# Patient Record
Sex: FEMALE | Race: BLACK OR AFRICAN AMERICAN | NOT HISPANIC OR LATINO | Employment: STUDENT | URBAN - METROPOLITAN AREA
[De-identification: names, ages, dates, MRNs, and addresses within clinical notes are randomized per-mention and may not be internally consistent; named-entity substitution may affect disease eponyms.]

---

## 2018-09-24 PROBLEM — F11.10 NARCOTIC ABUSE: Status: ACTIVE | Noted: 2018-09-24

## 2018-09-26 PROBLEM — F90.2 ADHD (ATTENTION DEFICIT HYPERACTIVITY DISORDER), COMBINED TYPE: Status: ACTIVE | Noted: 2018-09-26

## 2018-09-26 PROBLEM — Z87.39 HISTORY OF RIGHT TENNIS ELBOW: Status: ACTIVE | Noted: 2018-09-26

## 2018-09-26 PROBLEM — M62.838 MUSCLE SPASMS OF NECK: Status: ACTIVE | Noted: 2018-09-26

## 2019-04-17 PROBLEM — E55.9 VITAMIN D DEFICIENCY: Status: ACTIVE | Noted: 2019-04-17

## 2019-04-17 PROBLEM — F17.200 CURRENT SMOKER: Status: ACTIVE | Noted: 2019-04-17

## 2019-04-17 PROBLEM — Z00.00 PHYSICAL EXAM, ANNUAL: Status: ACTIVE | Noted: 2019-04-17

## 2019-05-12 PROBLEM — R31.21 ASYMPTOMATIC MICROSCOPIC HEMATURIA: Status: ACTIVE | Noted: 2019-05-12

## 2019-07-22 PROBLEM — Z00.00 PHYSICAL EXAM, ANNUAL: Status: RESOLVED | Noted: 2019-04-17 | Resolved: 2019-07-22

## 2019-09-10 PROBLEM — J01.00 ACUTE NON-RECURRENT MAXILLARY SINUSITIS: Status: ACTIVE | Noted: 2019-09-10

## 2019-12-16 PROBLEM — J01.00 ACUTE NON-RECURRENT MAXILLARY SINUSITIS: Status: RESOLVED | Noted: 2019-09-10 | Resolved: 2019-12-16

## 2021-11-13 ENCOUNTER — IMMUNIZATION (OUTPATIENT)
Dept: PRIMARY CARE CLINIC | Facility: CLINIC | Age: 41
End: 2021-11-13

## 2021-11-13 DIAGNOSIS — Z23 NEED FOR VACCINATION: Primary | ICD-10-CM

## 2021-11-13 PROCEDURE — 0004A COVID-19, MRNA, LNP-S, PF, 30 MCG/0.3 ML DOSE VACCINE: CPT | Mod: CV19,PBBFAC | Performed by: FAMILY MEDICINE

## 2022-02-08 PROBLEM — B02.29 POST HERPETIC NEURALGIA: Status: ACTIVE | Noted: 2022-02-08

## 2022-02-08 PROBLEM — F17.200 TOBACCO DEPENDENCE: Status: ACTIVE | Noted: 2022-02-08

## 2022-02-09 ENCOUNTER — TELEPHONE (OUTPATIENT)
Dept: PAIN MEDICINE | Facility: CLINIC | Age: 42
End: 2022-02-09

## 2022-02-09 NOTE — TELEPHONE ENCOUNTER
Contacted patient regarding referral with [facility]. Scheduled appointment.  Patient states her insurance lapses tomorrow.  She will be getting coverage through [employer].  If not, she hopes to be employed by time of appt with new insurance in place.    Jenn Yun RN

## 2022-03-08 ENCOUNTER — TELEPHONE (OUTPATIENT)
Dept: PSYCHIATRY | Facility: CLINIC | Age: 42
End: 2022-03-08

## 2022-03-08 NOTE — TELEPHONE ENCOUNTER
I spoke with Sylvia Thakur about her upcoming appt. With Thelma Dooleyice,that I received a message that she didn't have insurance coverage at this time. Pt. Said she needs to cancel appt. Because she doesn't have insurance coverage at this time.

## 2022-03-08 NOTE — TELEPHONE ENCOUNTER
I receive a message that pt. Insurance wasn't current and needs to be update before the upcoming appt. Or it will be self pay.

## 2022-08-11 ENCOUNTER — TELEPHONE (OUTPATIENT)
Dept: PAIN MEDICINE | Facility: CLINIC | Age: 42
End: 2022-08-11
Payer: COMMERCIAL

## 2022-08-12 ENCOUNTER — TELEPHONE (OUTPATIENT)
Dept: PAIN MEDICINE | Facility: CLINIC | Age: 42
End: 2022-08-12
Payer: COMMERCIAL

## 2022-08-15 ENCOUNTER — TELEPHONE (OUTPATIENT)
Dept: PAIN MEDICINE | Facility: CLINIC | Age: 42
End: 2022-08-15
Payer: COMMERCIAL

## 2022-08-24 ENCOUNTER — TELEPHONE (OUTPATIENT)
Dept: ORTHOPEDICS | Facility: CLINIC | Age: 42
End: 2022-08-24
Payer: COMMERCIAL

## 2022-08-24 NOTE — TELEPHONE ENCOUNTER
Contacted patient and confirmed appointment for 8/29/2022 at 4 pm at the [place] location.  Asked patient to arrive 30 minutes prior to appt time for xray.  Patient verbalized understanding of appt date, time and location.

## 2022-08-26 DIAGNOSIS — M25.552 LEFT HIP PAIN: Primary | ICD-10-CM

## 2022-08-29 ENCOUNTER — HOSPITAL ENCOUNTER (OUTPATIENT)
Dept: RADIOLOGY | Facility: HOSPITAL | Age: 42
Discharge: HOME | End: 2022-08-29
Attending: STUDENT IN AN ORGANIZED HEALTH CARE EDUCATION/TRAINING PROGRAM
Payer: COMMERCIAL

## 2022-08-29 ENCOUNTER — OFFICE VISIT (OUTPATIENT)
Dept: ORTHOPEDICS | Facility: CLINIC | Age: 42
End: 2022-08-29
Payer: COMMERCIAL

## 2022-08-29 VITALS — HEIGHT: 64 IN | BODY MASS INDEX: 33.29 KG/M2 | WEIGHT: 195 LBS

## 2022-08-29 DIAGNOSIS — M53.3 SACROILIAC JOINT PAIN: ICD-10-CM

## 2022-08-29 DIAGNOSIS — M25.552 LEFT HIP PAIN: ICD-10-CM

## 2022-08-29 DIAGNOSIS — M25.552 HIP PAIN, ACUTE, LEFT: Primary | ICD-10-CM

## 2022-08-29 PROCEDURE — 99203 PR OFFICE/OUTPT VISIT, NEW, LEVL III, 30-44 MIN: ICD-10-PCS | Mod: S$GLB,,, | Performed by: STUDENT IN AN ORGANIZED HEALTH CARE EDUCATION/TRAINING PROGRAM

## 2022-08-29 PROCEDURE — 1159F PR MEDICATION LIST DOCUMENTED IN MEDICAL RECORD: ICD-10-PCS | Mod: CPTII,S$GLB,, | Performed by: STUDENT IN AN ORGANIZED HEALTH CARE EDUCATION/TRAINING PROGRAM

## 2022-08-29 PROCEDURE — 73503 X-RAY EXAM HIP UNI 4/> VIEWS: CPT | Mod: 26,LT,, | Performed by: STUDENT IN AN ORGANIZED HEALTH CARE EDUCATION/TRAINING PROGRAM

## 2022-08-29 PROCEDURE — 73503 X-RAY EXAM HIP UNI 4/> VIEWS: CPT | Mod: TC,LT

## 2022-08-29 PROCEDURE — 1160F PR REVIEW ALL MEDS BY PRESCRIBER/CLIN PHARMACIST DOCUMENTED: ICD-10-PCS | Mod: CPTII,S$GLB,, | Performed by: STUDENT IN AN ORGANIZED HEALTH CARE EDUCATION/TRAINING PROGRAM

## 2022-08-29 PROCEDURE — 99203 OFFICE O/P NEW LOW 30 MIN: CPT | Mod: S$GLB,,, | Performed by: STUDENT IN AN ORGANIZED HEALTH CARE EDUCATION/TRAINING PROGRAM

## 2022-08-29 PROCEDURE — 1160F RVW MEDS BY RX/DR IN RCRD: CPT | Mod: CPTII,S$GLB,, | Performed by: STUDENT IN AN ORGANIZED HEALTH CARE EDUCATION/TRAINING PROGRAM

## 2022-08-29 PROCEDURE — 3008F BODY MASS INDEX DOCD: CPT | Mod: CPTII,S$GLB,, | Performed by: STUDENT IN AN ORGANIZED HEALTH CARE EDUCATION/TRAINING PROGRAM

## 2022-08-29 PROCEDURE — 99999 PR PBB SHADOW E&M-EST. PATIENT-LVL IV: CPT | Mod: PBBFAC,,, | Performed by: STUDENT IN AN ORGANIZED HEALTH CARE EDUCATION/TRAINING PROGRAM

## 2022-08-29 PROCEDURE — 3008F PR BODY MASS INDEX (BMI) DOCUMENTED: ICD-10-PCS | Mod: CPTII,S$GLB,, | Performed by: STUDENT IN AN ORGANIZED HEALTH CARE EDUCATION/TRAINING PROGRAM

## 2022-08-29 PROCEDURE — 99999 PR PBB SHADOW E&M-EST. PATIENT-LVL IV: ICD-10-PCS | Mod: PBBFAC,,, | Performed by: STUDENT IN AN ORGANIZED HEALTH CARE EDUCATION/TRAINING PROGRAM

## 2022-08-29 PROCEDURE — 1159F MED LIST DOCD IN RCRD: CPT | Mod: CPTII,S$GLB,, | Performed by: STUDENT IN AN ORGANIZED HEALTH CARE EDUCATION/TRAINING PROGRAM

## 2022-08-29 PROCEDURE — 73503 XR HIP WITH PELVIS WHEN PERFORMED, 4 OR MORE VIEWS LEFT: ICD-10-PCS | Mod: 26,LT,, | Performed by: STUDENT IN AN ORGANIZED HEALTH CARE EDUCATION/TRAINING PROGRAM

## 2022-08-29 RX ORDER — METHYLPREDNISOLONE 4 MG/1
TABLET ORAL
Qty: 21 EACH | Refills: 0 | Status: SHIPPED | OUTPATIENT
Start: 2022-08-29 | End: 2023-10-30

## 2022-08-29 NOTE — PATIENT INSTRUCTIONS
Assessment:  Sylvia Thakur is a 42 y.o. female   Chief Complaint   Patient presents with    Left Hip - Pain, Injury       Encounter Diagnosis   Name Primary?    Hip pain, acute, left Yes        Plan:  Prescribed medrol dose pack  Referred to [facility]   Follow up in 4-6 weeks after physical therapy.    Follow-up: 4-6 weeks  or sooner if there are any problems between now and then.    Thank you for choosing [facility] and Dr. Varun Mar for your orthopedic & sports medicine care. It is our goal to provide you with exceptional care that will help keep you healthy, active, and get you back in the game.    Please do not hesitate to reach out to us via email, phone, or MyChart with any questions, concerns, or feedback.    If you felt that you received exemplary care today, please consider leaving us feedback on [employer] at:  [email or web address]    If you are experiencing pain/discomfort ,or have questions after 5pm and would like to be connected to the [facility] on-call team, please call this number and specify which Sports Medicine provider is treating you: [phone]

## 2022-08-29 NOTE — PROGRESS NOTES
Patient ID: Sylvia Thakur  YOB: 1980  MRN: [id number]    Chief Complaint: Pain and Injury of the Left Hip      Referred By: Yamilet Swift MD for Left Hip Pain    History of Present Illness: Sylvia Thakur is a right-hand dominant 42 y.o. female who presents today with left hip pain.  Patient states that she has had left hip pain for the last 3 years but recently suffered a fall 3 weeks ago where she landed directly on her left hip and directly on her left elbow in a flexed position.  Patient is also concerned about her left elbow hurting today.  Patient states that her left hip is a constant, aching and throbbing sensation that she rates at a 6/10.  She states that she uses Biofreeze, ice packs, heating pads and ibuprofen to give her temporary relief from the pain.  She states that the weather changes increase her pain.  She states that the hip doesn't hurt to palpate but with movement it hurts in the gluteal region.     The patient is active in none.  Occupation: Teacher       Past Medical History:   Past Medical History:   Diagnosis Date    Acquired hypothyroidism     Anxiety     Attention deficit hyperactivity disorder (ADHD), predominantly inattentive type     Generalized anxiety disorder     Herpes zoster     Hyperprolactinemia     Menopause     Migraine     Migraine     Narcotic addiction     Postherpetic neuralgia     Primary fibromyalgia     Vitamin D deficiency      History reviewed. No pertinent surgical history.  Family History   Problem Relation Age of Onset    Hypertension Mother     Heart disease Father      Social History     Socioeconomic History    Marital status:    Tobacco Use    Smoking status: Former    Smokeless tobacco: Never   Substance and Sexual Activity    Alcohol use: Yes     Comment: social    Drug use: No    Sexual activity: Yes     Medication List with Changes/Refills   New Medications    METHYLPREDNISOLONE (MEDROL DOSEPACK) 4 MG TABLET    use as  directed   Current Medications    ALPRAZOLAM (XANAX) 0.5 MG TABLET    Take 0.5 mg by mouth 2 (two) times daily as needed.    AMITRIPTYLINE (ELAVIL) 75 MG TABLET    TAKE 1 TABLET(75 MG) BY MOUTH EVERY EVENING    HYDROXYZINE HCL (ATARAX) 25 MG TABLET    Take 1 tablet (25 mg total) by mouth 3 (three) times daily as needed for Itching or Anxiety.    SERTRALINE (ZOLOFT) 100 MG TABLET    Take 1 tablet (100 mg total) by mouth once daily.    VALACYCLOVIR (VALTREX) 500 MG TABLET    Take 1 tablet (500 mg total) by mouth 2 (two) times daily.     Review of patient's allergies indicates:   Allergen Reactions    Iodine and iodide containing products Anaphylaxis       Physical Exam:   Body mass index is 33.47 kg/m².    GENERAL: Well appearing, in no acute distress.  HEAD: Normocephalic and atraumatic.  ENT: External ears and nose grossly normal.  EYES: EOMI bilaterally  PULMONARY: Respirations are grossly even and non-labored.  NEURO: Awake, alert, and oriented x 3.  SKIN: No obvious rashes appreciated.  PSYCH: Mood & affect are appropriate.    Detailed MSK exam:   Left hip exam: TTP sacroiliac joint. Muscle strength 4+/5. Negative straight leg raise. Sensation intact bilaterally. Negative JANIYA and FADIR.   Left elbow exam: full active and passive ROM. TTP olecranon bursa. Muscle strength 5/5 with elbow flexion, extension, supination and pronation. Sensation intact.  strength 5/5.     Imaging:  X-Ray Hip 4 or more views Left (with Pelvis when performed)  Narrative: EXAMINATION:  4 radiographic views of the LEFT HIP.    CLINICAL HISTORY:  Pain in left hip    TECHNIQUE:  4 Radiographic views of the LEFT HIP.    COMPARISON:  None.    FINDINGS:  4  Views of the left hip demonstrate normal alignment.  There is no fracture.  There is no bony mass lesion.  There is no soft tissue swelling.  Impression: No acute abnormality of the left hip.    Electronically signed by: Wang Piedra  MD  Date:    08/29/2022  Time:    16:05        Relevant imaging results were reviewed and interpreted by me and per my read shows no acute findings.  This was discussed with the patient and / or family today.     Assessment:  Sylvia Thakur is a 42 y.o. female presenting with acute on chronic left hip pain aggravated by a recent fall. History, physical and radiographs are consistent with a likely diagnosis of SI joint pain, mild muscle spasm, left elbow contusion with inflamed olecranon bursa. Unlikely true sciatica but could have some mild nerve irritation. Trial medrol dose pack and formal PT. Continue conservative management for pain. Advised discontinuing ibuprofen while taking medrol dose pack. Follow up in 6 weeks for recheck. Consider advanced imaging if not improving. All questions answered.     Hip pain, acute, left  -     Ambulatory referral/consult to Orthopedics  -     methylPREDNISolone (MEDROL DOSEPACK) 4 mg tablet; use as directed  Dispense: 21 each; Refill: 0  -     Ambulatory referral/consult to Physical/Occupational Therapy; Future; Expected date: 09/05/2022       A copy of today's visit note has been sent to the referring provider.     Electronically signed:  Varun Mar MD, MPH  08/29/2022  4:15 PM

## 2022-08-30 ENCOUNTER — TELEPHONE (OUTPATIENT)
Dept: ORTHOPEDICS | Facility: CLINIC | Age: 42
End: 2022-08-30
Payer: COMMERCIAL

## 2022-10-04 ENCOUNTER — TELEPHONE (OUTPATIENT)
Dept: ORTHOPEDICS | Facility: CLINIC | Age: 42
End: 2022-10-04
Payer: COMMERCIAL

## 2022-10-04 DIAGNOSIS — M25.522 LEFT ELBOW PAIN: Primary | ICD-10-CM

## 2022-10-04 NOTE — TELEPHONE ENCOUNTER
Patient was unable to get off work in time for appointment.  Patient has not been scheduled for physical therapy for her hip as of this time and is still having elbow pain.  Rescheduled patient for appointment on Friday, 10/7 at 4 pm for elbow appt with 3:30 xray and will follow up with physical therapy company that referral was placed with for hip to find out why patient hasn't been contacted for scheduling.   ----- Message from Josh Pope sent at 10/4/2022  4:02 PM CDT -----  Contact: Sylvia  Patient is calling to speak with the nurse regarding appt. Reports having appt scheduled for today at 4pm but will be running late because her job is short staffed and wants to know if she can still come to appt. Please give pt a call at .[phone]

## 2022-10-10 ENCOUNTER — OFFICE VISIT (OUTPATIENT)
Dept: ORTHOPEDICS | Facility: CLINIC | Age: 42
End: 2022-10-10
Payer: COMMERCIAL

## 2022-10-10 ENCOUNTER — HOSPITAL ENCOUNTER (OUTPATIENT)
Dept: RADIOLOGY | Facility: HOSPITAL | Age: 42
Discharge: HOME | End: 2022-10-10
Attending: STUDENT IN AN ORGANIZED HEALTH CARE EDUCATION/TRAINING PROGRAM
Payer: COMMERCIAL

## 2022-10-10 VITALS — WEIGHT: 195 LBS | BODY MASS INDEX: 33.29 KG/M2 | HEIGHT: 64 IN

## 2022-10-10 DIAGNOSIS — G56.22 ULNAR NEUROPATHY OF LEFT UPPER EXTREMITY: ICD-10-CM

## 2022-10-10 DIAGNOSIS — M25.522 LEFT ELBOW PAIN: ICD-10-CM

## 2022-10-10 DIAGNOSIS — M70.22 OLECRANON BURSITIS OF LEFT ELBOW: Primary | ICD-10-CM

## 2022-10-10 PROCEDURE — 73080 X-RAY EXAM OF ELBOW: CPT | Mod: TC,PO,LT

## 2022-10-10 PROCEDURE — 99999 PR PBB SHADOW E&M-EST. PATIENT-LVL IV: CPT | Mod: PBBFAC,,, | Performed by: STUDENT IN AN ORGANIZED HEALTH CARE EDUCATION/TRAINING PROGRAM

## 2022-10-10 PROCEDURE — 1160F RVW MEDS BY RX/DR IN RCRD: CPT | Mod: CPTII,S$GLB,, | Performed by: STUDENT IN AN ORGANIZED HEALTH CARE EDUCATION/TRAINING PROGRAM

## 2022-10-10 PROCEDURE — 99214 OFFICE O/P EST MOD 30 MIN: CPT | Mod: S$GLB,,, | Performed by: STUDENT IN AN ORGANIZED HEALTH CARE EDUCATION/TRAINING PROGRAM

## 2022-10-10 PROCEDURE — 1159F MED LIST DOCD IN RCRD: CPT | Mod: CPTII,S$GLB,, | Performed by: STUDENT IN AN ORGANIZED HEALTH CARE EDUCATION/TRAINING PROGRAM

## 2022-10-10 PROCEDURE — 3008F BODY MASS INDEX DOCD: CPT | Mod: CPTII,S$GLB,, | Performed by: STUDENT IN AN ORGANIZED HEALTH CARE EDUCATION/TRAINING PROGRAM

## 2022-10-10 PROCEDURE — 73080 XR ELBOW COMPLETE 3 VIEW LEFT: ICD-10-PCS | Mod: 26,LT,, | Performed by: RADIOLOGY

## 2022-10-10 PROCEDURE — 73080 X-RAY EXAM OF ELBOW: CPT | Mod: 26,LT,, | Performed by: RADIOLOGY

## 2022-10-10 PROCEDURE — 1159F PR MEDICATION LIST DOCUMENTED IN MEDICAL RECORD: ICD-10-PCS | Mod: CPTII,S$GLB,, | Performed by: STUDENT IN AN ORGANIZED HEALTH CARE EDUCATION/TRAINING PROGRAM

## 2022-10-10 PROCEDURE — 99214 PR OFFICE/OUTPT VISIT, EST, LEVL IV, 30-39 MIN: ICD-10-PCS | Mod: S$GLB,,, | Performed by: STUDENT IN AN ORGANIZED HEALTH CARE EDUCATION/TRAINING PROGRAM

## 2022-10-10 PROCEDURE — 1160F PR REVIEW ALL MEDS BY PRESCRIBER/CLIN PHARMACIST DOCUMENTED: ICD-10-PCS | Mod: CPTII,S$GLB,, | Performed by: STUDENT IN AN ORGANIZED HEALTH CARE EDUCATION/TRAINING PROGRAM

## 2022-10-10 PROCEDURE — 99999 PR PBB SHADOW E&M-EST. PATIENT-LVL IV: ICD-10-PCS | Mod: PBBFAC,,, | Performed by: STUDENT IN AN ORGANIZED HEALTH CARE EDUCATION/TRAINING PROGRAM

## 2022-10-10 PROCEDURE — 3008F PR BODY MASS INDEX (BMI) DOCUMENTED: ICD-10-PCS | Mod: CPTII,S$GLB,, | Performed by: STUDENT IN AN ORGANIZED HEALTH CARE EDUCATION/TRAINING PROGRAM

## 2022-10-10 RX ORDER — VALACYCLOVIR HYDROCHLORIDE 1 G/1
TABLET, FILM COATED ORAL
COMMUNITY
Start: 2022-09-27 | End: 2024-02-06

## 2022-10-10 NOTE — PROGRESS NOTES
Patient ID: Sylvia Thakur  YOB: 1980  MRN: [id number]    Chief Complaint: Pain of the Left Elbow      History of Present Illness: Sylvia Thakur is a right-hand dominant 42 y.o. female who presents today with left elbow pain.  Patient was last seen in clinic for left hip pain on 8/29/2022   She has had left elbow pain for several months after suffering a fall at the beginning of August where she landed on her left hip and elbow.  She states that the elbow became very swollen and inflamed several weeks ago but has since went down.  She describes her pain as an intermittent, throbbing pain that is relieved with heat.  She states that excessive movement increases her pain.      The patient is active in none.  Occupation: Teacher      Past Medical History:   Past Medical History:   Diagnosis Date    Acquired hypothyroidism     Anxiety     Attention deficit hyperactivity disorder (ADHD), predominantly inattentive type     Generalized anxiety disorder     Herpes zoster     Hyperprolactinemia     Menopause     Migraine     Migraine     Narcotic addiction     Postherpetic neuralgia     Primary fibromyalgia     Vitamin D deficiency      History reviewed. No pertinent surgical history.  Family History   Problem Relation Age of Onset    Hypertension Mother     Heart disease Father      Social History     Socioeconomic History    Marital status:    Tobacco Use    Smoking status: Former    Smokeless tobacco: Never   Substance and Sexual Activity    Alcohol use: Yes     Comment: social    Drug use: No    Sexual activity: Yes     Medication List with Changes/Refills   Current Medications    ALPRAZOLAM (XANAX) 0.5 MG TABLET    Take 0.5 mg by mouth 2 (two) times daily as needed.    AMITRIPTYLINE (ELAVIL) 75 MG TABLET    TAKE 1 TABLET(75 MG) BY MOUTH EVERY EVENING    GABAPENTIN (NEURONTIN) 300 MG CAPSULE    Take 1 capsule (300 mg total) by mouth 2 (two) times daily.    HYDROXYZINE HCL (ATARAX) 25 MG TABLET     Take 1 tablet (25 mg total) by mouth 3 (three) times daily as needed for Itching or Anxiety.    METHYLPREDNISOLONE (MEDROL DOSEPACK) 4 MG TABLET    use as directed    SERTRALINE (ZOLOFT) 100 MG TABLET    Take 1 tablet (100 mg total) by mouth once daily.    VALACYCLOVIR (VALTREX) 1000 MG TABLET        VALACYCLOVIR (VALTREX) 500 MG TABLET    Take 1 tablet (500 mg total) by mouth 2 (two) times daily.     Review of patient's allergies indicates:   Allergen Reactions    Iodine and iodide containing products Anaphylaxis       Physical Exam:   Body mass index is 33.47 kg/m².    GENERAL: Well appearing, in no acute distress.  HEAD: Normocephalic and atraumatic.  ENT: External ears and nose grossly normal.  EYES: EOMI bilaterally  PULMONARY: Respirations are grossly even and non-labored.  NEURO: Awake, alert, and oriented x 3.  SKIN: No obvious rashes appreciated.  PSYCH: Mood & affect are appropriate.    Detailed MSK exam:     Left elbow exam: mild swelling olecranon bursa with mild TTP. Full active and passive ROM. Negative ulnar Tinel sign. Negative resisted wrist extension and negative resisted middle finger extension. No erythema or ecchymosis. Muscle strength 5/5, sensation intact, pulses 2+.     Imaging:  X-Ray Elbow Complete Left  Narrative: EXAMINATION:  XR ELBOW COMPLETE 3 VIEW LEFT    CLINICAL HISTORY:  Pain in left elbow    TECHNIQUE:  AP, lateral, and oblique views of the left elbow were performed.    COMPARISON:  None    FINDINGS:  No acute fracture or dislocation.  The joint spaces are preserved.  No significant joint effusion.    No significant surrounding soft tissue swelling.    No radiopaque foreign body.  Impression: No acute radiographic findings of the left elbow.    Electronically signed by: Rafael Ramsay  Date:    10/10/2022  Time:    10:41        Relevant imaging results were reviewed and interpreted by me and per my read shows no acute abnormalities.  This was discussed with the patient and / or  family today.     Assessment:  Sylvia Thakur is a 42 y.o. female following up for left elbow pain. History, physical and radiographs consistent with olecranon bursitis and mild ulnar neuropathy.   Plan: ice, voltaren gel. Return precautions given. Would not recommend aspiration at this time.   Follow up as needed. All questions answered.     Olecranon bursitis of left elbow    Ulnar neuropathy of left upper extremity       Electronically signed:  Varun Mar MD, MPH  10/10/2022  10:43 AM

## 2022-10-10 NOTE — PATIENT INSTRUCTIONS
Assessment:  Sylvia Thakur is a 42 y.o. female   Chief Complaint   Patient presents with    Left Elbow - Pain       No diagnosis found.     Plan:  Apply topical diclofenac (Voltaren) up to 4 times a day to the affected area.  It can be bought over the counter at any local pharmacy.        Follow-up: If you feel like you need us feel free to reach out through [employer] or feel free to contact us at [phone]   or sooner if there are any problems between now and then.    Thank you for choosing [facility] and Dr. Varun Mar for your orthopedic & sports medicine care. It is our goal to provide you with exceptional care that will help keep you healthy, active, and get you back in the game.    Please do not hesitate to reach out to us via email, phone, or [employer] with any questions, concerns, or feedback.    If you felt that you received exemplary care today, please consider leaving us feedback on [employer] at:  [email or web address]    If you are experiencing pain/discomfort ,or have questions after 5pm and would like to be connected to the [facility] on-call team, please call this number and specify which Sports Medicine provider is treating you: [phone]

## 2023-02-02 PROBLEM — J01.00 ACUTE NON-RECURRENT MAXILLARY SINUSITIS: Status: ACTIVE | Noted: 2023-02-02

## 2023-05-08 PROBLEM — J01.00 ACUTE NON-RECURRENT MAXILLARY SINUSITIS: Status: RESOLVED | Noted: 2023-02-02 | Resolved: 2023-05-08

## 2023-12-30 PROBLEM — F41.1 GAD (GENERALIZED ANXIETY DISORDER): Status: ACTIVE | Noted: 2023-12-30

## 2023-12-30 PROBLEM — F41.0 PANIC DISORDER: Status: ACTIVE | Noted: 2023-12-30

## 2024-01-10 ENCOUNTER — TELEPHONE (OUTPATIENT)
Dept: DERMATOLOGY | Facility: CLINIC | Age: 44
End: 2024-01-10
Payer: COMMERCIAL

## 2024-01-10 NOTE — TELEPHONE ENCOUNTER
Attempted to return call. No answer. Message left to return call.   ----- Message from Marguerite Patiño sent at 1/10/2024 12:19 PM CST -----  Contact: Jose Branch is calling in  regards to the pain she have in her scalp during her cycle and last up to 2 weeks and would like to know what to do .please call back at .[phone]          Thanks  SHANNA

## 2024-01-12 ENCOUNTER — TELEPHONE (OUTPATIENT)
Dept: DERMATOLOGY | Facility: CLINIC | Age: 44
End: 2024-01-12
Payer: COMMERCIAL

## 2024-01-12 NOTE — TELEPHONE ENCOUNTER
Attempted to return call. No answer. Message left to return call.   ----- Message from Camilla Tovar RN sent at 1/11/2024  5:02 PM CST -----    ----- Message -----  From: Khalif Santos  Sent: 1/11/2024   4:47 PM CST  To: Ramiro Abarca Staff    Type:  Patient Returning Call    Who Called:PT  Who Left Message for Patient:CAMILLA  Does the patient know what this is regarding?:YES  Would the patient rather a call back or a response via [facility]? CALL  Best Call Back Number:.Telephone Information:  [facility]          [phone]      Additional Information: Pt returning call

## 2024-02-07 ENCOUNTER — TELEPHONE (OUTPATIENT)
Dept: PAIN MEDICINE | Facility: CLINIC | Age: 44
End: 2024-02-07
Payer: COMMERCIAL

## 2024-02-08 ENCOUNTER — TELEPHONE (OUTPATIENT)
Dept: PAIN MEDICINE | Facility: CLINIC | Age: 44
End: 2024-02-08
Payer: COMMERCIAL

## 2024-02-08 NOTE — TELEPHONE ENCOUNTER
----- Message from Marimar Carpenter sent at 2/8/2024 10:01 AM CST -----  Regarding: pt call back  Type:Patient Returning Call:Pt        Who Called: Nurse         Who Left Message for Patient:         Does the patient know what this is regarding? Missed call         Best Call Back Number: Telephone Information:  [employer]          [phone]            Additional Information:

## 2024-03-06 ENCOUNTER — TELEPHONE (OUTPATIENT)
Dept: PAIN MEDICINE | Facility: CLINIC | Age: 44
End: 2024-03-06
Payer: COMMERCIAL

## 2024-03-06 NOTE — TELEPHONE ENCOUNTER
Attempt to call patient to confirm appointment. Patent did not answer, Left message on patients voice mail to call back at earliest convenience to confirm or reschedule p.t apt.     Christina HASTINGS

## 2024-03-14 ENCOUNTER — TELEPHONE (OUTPATIENT)
Dept: PAIN MEDICINE | Facility: CLINIC | Age: 44
End: 2024-03-14
Payer: COMMERCIAL

## 2024-03-15 ENCOUNTER — HOSPITAL ENCOUNTER (OUTPATIENT)
Dept: RADIOLOGY | Facility: HOSPITAL | Age: 44
Discharge: HOME | End: 2024-03-15
Attending: ANESTHESIOLOGY
Payer: COMMERCIAL

## 2024-03-15 ENCOUNTER — TELEPHONE (OUTPATIENT)
Dept: PAIN MEDICINE | Facility: CLINIC | Age: 44
End: 2024-03-15

## 2024-03-15 ENCOUNTER — OFFICE VISIT (OUTPATIENT)
Dept: PAIN MEDICINE | Facility: CLINIC | Age: 44
End: 2024-03-15
Payer: COMMERCIAL

## 2024-03-15 VITALS
DIASTOLIC BLOOD PRESSURE: 80 MMHG | RESPIRATION RATE: 18 BRPM | HEIGHT: 64 IN | HEART RATE: 87 BPM | SYSTOLIC BLOOD PRESSURE: 126 MMHG | WEIGHT: 174.38 LBS | BODY MASS INDEX: 29.77 KG/M2

## 2024-03-15 DIAGNOSIS — M47.816 LUMBAR SPONDYLOSIS: ICD-10-CM

## 2024-03-15 DIAGNOSIS — M16.12 PRIMARY OSTEOARTHRITIS OF LEFT HIP: ICD-10-CM

## 2024-03-15 DIAGNOSIS — B02.29 POST HERPETIC NEURALGIA: Primary | ICD-10-CM

## 2024-03-15 DIAGNOSIS — M46.1 SACROILIITIS: ICD-10-CM

## 2024-03-15 DIAGNOSIS — M51.36 DDD (DEGENERATIVE DISC DISEASE), LUMBAR: ICD-10-CM

## 2024-03-15 PROCEDURE — 73502 X-RAY EXAM HIP UNI 2-3 VIEWS: CPT | Mod: 26,LT,, | Performed by: RADIOLOGY

## 2024-03-15 PROCEDURE — 1159F MED LIST DOCD IN RCRD: CPT | Mod: CPTII,S$GLB,, | Performed by: ANESTHESIOLOGY

## 2024-03-15 PROCEDURE — 3074F SYST BP LT 130 MM HG: CPT | Mod: CPTII,S$GLB,, | Performed by: ANESTHESIOLOGY

## 2024-03-15 PROCEDURE — 99204 OFFICE O/P NEW MOD 45 MIN: CPT | Mod: S$GLB,,, | Performed by: ANESTHESIOLOGY

## 2024-03-15 PROCEDURE — 3079F DIAST BP 80-89 MM HG: CPT | Mod: CPTII,S$GLB,, | Performed by: ANESTHESIOLOGY

## 2024-03-15 PROCEDURE — 73502 X-RAY EXAM HIP UNI 2-3 VIEWS: CPT | Mod: TC,PN,LT

## 2024-03-15 PROCEDURE — 72114 X-RAY EXAM L-S SPINE BENDING: CPT | Mod: TC,PN

## 2024-03-15 PROCEDURE — 99999 PR PBB SHADOW E&M-EST. PATIENT-LVL IV: CPT | Mod: PBBFAC,,, | Performed by: ANESTHESIOLOGY

## 2024-03-15 PROCEDURE — 3008F BODY MASS INDEX DOCD: CPT | Mod: CPTII,S$GLB,, | Performed by: ANESTHESIOLOGY

## 2024-03-15 PROCEDURE — 72114 X-RAY EXAM L-S SPINE BENDING: CPT | Mod: 26,,, | Performed by: RADIOLOGY

## 2024-03-15 RX ORDER — TOPIRAMATE 25 MG/1
50 TABLET ORAL NIGHTLY
Qty: 60 TABLET | Refills: 1 | Status: SHIPPED | OUTPATIENT
Start: 2024-03-15 | End: 2024-04-01

## 2024-03-15 NOTE — TELEPHONE ENCOUNTER
----- Message from Joaquina Dias sent at 3/15/2024  9:49 AM CDT -----  Contact: [phone]  Patient is calling in regards to getting a referral to  [facility] at fax number [phone]. Please call pt back at [phone]. Thanks KB

## 2024-03-15 NOTE — PROGRESS NOTES
New Patient Interventional Pain Note (Initial Visit)    Referring Physician: Tianna Renner    PCP: Yamilet Swift MD    Chief Complaint:  Post herpetic Neuralgia pain and left hip pain       SUBJECTIVE:    Sylvia Thakur is a 43 y.o. female who presents to the clinic for the evaluation of post herpetic neuralgia pain.  Patient reports that initial shingles lesion started when she was in college over her right arm.  This rash resolved, but reports in 2003 after having her fallopian tubes time she had sudden recurrence of this pain with intermittent new rash locations.  Patient reports new rash locations of her scalp and the right side of her face.  Patient also reports tingling numbness pain across the lumbar spine that she says feels like post herpetic neuralgia pain.  Pain is typically worse with stress in her menstrual cycles, better with relaxation.  Patient reports 5 year history of left hip pain.  Patient denies any inciting traumas to his hip pain any previous surgeries on her lumbar spine or left hip.  Pain described as stabbing aching pain in the left buttocks area.  This pain then radiates to the left lateral hip and down the lower extremity along the posterior aspect to the knee.  Patient denies any significant right-sided lower back and hip pain.  Pain is worse with prolonged standing and walking, better with rest and elevation.  Pain is currently rated at a 7/10. Denies any fevers, chills, changes in gait, saddle anesthesia, or bowel and bladder incontinence      Non-Pharmacologic Treatments:  Physical Therapy/Home Exercise: yes  Ice/Heat:yes  TENS: no  Acupuncture: no  Massage: yes  Chiropractic: no        Previous Pain Medications:  NSAIDs, Tylenol, muscle relaxers, neuropathics, opioids, topicals       report:  Reviewed and consistent with medication use as prescribed.    Pain Procedures:   None    Pain Disability Index Review:         3/15/2024     8:06 AM   Last 3 PDI Scores   Pain  Disability Index (PDI) 49       Imagin radiographic views of the LEFT HIP. 2024     CLINICAL HISTORY:  Pain in left hip     TECHNIQUE:  4 Radiographic views of the LEFT HIP.     COMPARISON:  None.     FINDINGS:  4  Views of the left hip demonstrate normal alignment.  There is no fracture.  There is no bony mass lesion.  There is no soft tissue swelling.     Impression:     No acute abnormality of the left hip.    Past Medical History:   Diagnosis Date    Acquired hypothyroidism     Anxiety     Attention deficit hyperactivity disorder (ADHD), predominantly inattentive type     Generalized anxiety disorder     Herpes zoster     Hyperprolactinemia     Menopause     Migraine     Migraine     Narcotic addiction     Postherpetic neuralgia     Primary fibromyalgia     Vitamin D deficiency      History reviewed. No pertinent surgical history.  Social History     Socioeconomic History    Marital status:    Tobacco Use    Smoking status: Former    Smokeless tobacco: Never   Substance and Sexual Activity    Alcohol use: Yes     Comment: social    Drug use: No    Sexual activity: Yes     Family History   Problem Relation Age of Onset    Hypertension Mother     Heart disease Father        Review of patient's allergies indicates:   Allergen Reactions    Iodine and iodide containing products Anaphylaxis       Current Outpatient Medications   Medication Sig    ALPRAZolam (XANAX) 0.5 MG tablet One tab poqd prn anxiety    amitriptyline (ELAVIL) 50 MG tablet Take 2 tablets (100 mg total) by mouth every evening.    sertraline (ZOLOFT) 100 MG tablet Take 1 tablet (100 mg total) by mouth once daily.    triamcinolone acetonide 0.1% (KENALOG) 0.1 % cream Apply topically 2 (two) times daily. Apply bid prm rasj    valACYclovir (VALTREX) 500 MG tablet Take 1 tablet (500 mg total) by mouth once daily.    VYVANSE 40 mg Cap     topiramate (TOPAMAX) 25 MG tablet Take 2 tablets (50 mg total) by mouth every evening.     No  "current facility-administered medications for this visit.         ROS  Review of Systems   Constitutional:  Negative for chills, diaphoresis, fatigue and fever.   HENT:  Negative for ear discharge, ear pain, rhinorrhea, trouble swallowing and voice change.    Eyes:  Negative for pain and redness.   Respiratory:  Negative for chest tightness, shortness of breath, wheezing and stridor.    Cardiovascular:  Negative for chest pain and leg swelling.   Gastrointestinal:  Negative for blood in stool, diarrhea, nausea and vomiting.   Endocrine: Negative for cold intolerance and heat intolerance.   Genitourinary:  Negative for dysuria, hematuria and urgency.   Musculoskeletal:  Positive for arthralgias, back pain, gait problem and myalgias. Negative for joint swelling, neck pain and neck stiffness.   Skin:  Positive for rash.   Neurological:  Positive for weakness, numbness and headaches. Negative for tremors, seizures, speech difficulty and light-headedness.   Hematological:  Does not bruise/bleed easily.   Psychiatric/Behavioral:  Negative for agitation, confusion and suicidal ideas.             OBJECTIVE:  /80   Pulse 87   Resp 18   Ht 5' 4" (1.626 m)   Wt 79.1 kg (174 lb 6.1 oz)   LMP 02/19/2024   BMI 29.93 kg/m²         Physical Exam  Constitutional:       General: She is not in acute distress.     Appearance: Normal appearance. She is not ill-appearing.   HENT:      Head: Normocephalic and atraumatic.      Nose: No congestion or rhinorrhea.   Eyes:      Extraocular Movements: Extraocular movements intact.      Pupils: Pupils are equal, round, and reactive to light.   Cardiovascular:      Pulses: Normal pulses.   Pulmonary:      Effort: Pulmonary effort is normal.   Musculoskeletal:      Right hip: Normal.      Left hip: Tenderness present. No deformity, lacerations, bony tenderness or crepitus. Decreased range of motion. Decreased strength.   Skin:     General: Skin is warm.      Capillary Refill: Capillary " refill takes less than 2 seconds.      Findings: Lesion present.   Neurological:      General: No focal deficit present.      Mental Status: She is alert and oriented to person, place, and time.      Sensory: No sensory deficit.      Motor: Weakness present. No abnormal muscle tone.      Gait: Gait normal.      Deep Tendon Reflexes: Reflexes normal.      Comments: 4/5 strength in left hip adduction and left knee extension   Psychiatric:         Mood and Affect: Mood normal.         Behavior: Behavior normal.         Thought Content: Thought content normal.           Musculoskeletal:      Lumbar Exam  Incision: no  Pain with Flexion: yes  Pain with Extension: yes  ROM:  Decreased  Paraspinous TTP:  Left lumbar paraspinous  Facet TTP:  L5-S1  Facet Loading:  Positive bilaterally  SLR:  Negative bilaterally  SIJ TTP:  Positive on left  JANIYA:  Positive on the left with positive compression and distraction      LABS:  Lab Results   Component Value Date    WBC 5.1 02/08/2022    HGB 12.7 02/08/2022    HCT 39.6 02/08/2022    MCV 84 02/08/2022     02/08/2022       CMP  Sodium   Date Value Ref Range Status   02/08/2022 140 134 - 144 mmol/L Final     Potassium   Date Value Ref Range Status   02/08/2022 4.5 3.5 - 5.2 mmol/L Final     Chloride   Date Value Ref Range Status   02/08/2022 98 96 - 106 mmol/L Final     CO2   Date Value Ref Range Status   02/08/2022 23 20 - 29 mmol/L Final     Glucose   Date Value Ref Range Status   02/08/2022 72 65 - 99 mg/dL Final     BUN   Date Value Ref Range Status   02/08/2022 10 6 - 24 mg/dL Final     Creatinine   Date Value Ref Range Status   02/08/2022 0.97 0.57 - 1.00 mg/dL Final     Calcium   Date Value Ref Range Status   02/08/2022 9.0 8.7 - 10.2 mg/dL Final     Total Protein   Date Value Ref Range Status   11/09/2017 7.3 6.0 - 8.5 g/dL Final     Albumin   Date Value Ref Range Status   02/08/2022 4.2 3.8 - 4.8 g/dL Final   11/09/2017 4.6 3.5 - 5.5 g/dL Final     Total Bilirubin  "  Date Value Ref Range Status   02/08/2022 <0.2 0.0 - 1.2 mg/dL Final     Alkaline Phosphatase   Date Value Ref Range Status   11/09/2017 64 39 - 117 IU/L Final     AST   Date Value Ref Range Status   02/08/2022 15 0 - 40 IU/L Final     ALT   Date Value Ref Range Status   02/08/2022 17 0 - 32 IU/L Final     eGFR if non    Date Value Ref Range Status   02/08/2022 73 >59 mL/min/1.73 Final       No results found for: "LABA1C", "HGBA1C"          ASSESSMENT:       43 y.o. year old female with post herpetic neuralgia pain and left hip pain, consistent with     1. Post herpetic neuralgia  topiramate (TOPAMAX) 25 MG tablet      2. Lumbar spondylosis  X-Ray Lumbar Complete Including Flex And Ext    topiramate (TOPAMAX) 25 MG tablet      3. DDD (degenerative disc disease), lumbar  topiramate (TOPAMAX) 25 MG tablet      4. Sacroiliitis  X-Ray Lumbar Complete Including Flex And Ext    topiramate (TOPAMAX) 25 MG tablet    Ambulatory referral/consult to Physical/Occupational Therapy      5. Primary osteoarthritis of left hip  X-Ray Hip 2 or 3 views Left (with Pelvis when performed)    topiramate (TOPAMAX) 25 MG tablet    Ambulatory referral/consult to Physical/Occupational Therapy        Post herpetic neuralgia  -     topiramate (TOPAMAX) 25 MG tablet; Take 2 tablets (50 mg total) by mouth every evening.  Dispense: 60 tablet; Refill: 1    Lumbar spondylosis  -     X-Ray Lumbar Complete Including Flex And Ext; Future; Expected date: 03/15/2024  -     topiramate (TOPAMAX) 25 MG tablet; Take 2 tablets (50 mg total) by mouth every evening.  Dispense: 60 tablet; Refill: 1    DDD (degenerative disc disease), lumbar  -     topiramate (TOPAMAX) 25 MG tablet; Take 2 tablets (50 mg total) by mouth every evening.  Dispense: 60 tablet; Refill: 1    Sacroiliitis  -     X-Ray Lumbar Complete Including Flex And Ext; Future; Expected date: 03/15/2024  -     topiramate (TOPAMAX) 25 MG tablet; Take 2 tablets (50 mg total) by mouth " every evening.  Dispense: 60 tablet; Refill: 1  -     Ambulatory referral/consult to Physical/Occupational Therapy; Future; Expected date: 03/22/2024    Primary osteoarthritis of left hip  -     X-Ray Hip 2 or 3 views Left (with Pelvis when performed); Future; Expected date: 03/15/2024  -     topiramate (TOPAMAX) 25 MG tablet; Take 2 tablets (50 mg total) by mouth every evening.  Dispense: 60 tablet; Refill: 1  -     Ambulatory referral/consult to Physical/Occupational Therapy; Future; Expected date: 03/22/2024             PLAN:   - Interventions:   Left hip pain appears to be most consistent with sacroiliitis.  We will obtain updated imaging to further evaluate this pain.   Patient presents with unusual presentation of post herpetic neuralgia pain.  Patient reports new lesions and recurrent lesions locations other than her initial shingles rash on her right upper extremity.  Patient is said to be evaluated by dermatology    - Anticoagulation use:   No no anticoagulation    - Medications:   Continue amitriptyline 100 mg at night   Start Topamax 25 mg at night.  Patient may titrate up to 50 mg at night    - Therapy:    Refer patient to formal physical therapy for left hip pain    - Imaging/Diagnostic:   We will obtain updated x-rays of left hip as well as lumbar spine to further evaluate left hip pain.    - Consults:   Continue follow up with dermatology for recurrent skin lesions in history of post herpetic neuralgia of right upper extremity  Can consider referral back to Orthopedics for left hip pain in the future        - Patient Questions: Answered all of the patient's questions regarding diagnosis, therapy, and treatment     This condition does not require this patient to take time off of work, and the primary goal of our Pain Management services is to improve the patient's functional capacity.     - Follow up visit: return to clinic in 4-5 weeks        The above plan and management options were discussed at  length with patient. Patient is in agreement with the above and verbalized understanding.    I discussed the goals of interventional chronic pain management with the patient on today's visit.  I explained the utility of injections for diagnostic and therapeutic purposes.  We discussed a multimodal approach to pain including treating the patient's given worst pain at any given time.  We will use a systematic approach to addressing pain.  We will also adopt a multimodal approach that includes injections, adjuvant medications, physical therapy, at times psychiatry.  There may be a limited role for opioid use intermittently in the treatment of pain, more particularly for acute pain although no one approach can be used as a sole treatment modality.    I emphasized the importance of regular exercise, core strengthening and stretching, diet and weight loss as a cornerstone of long-term pain management.      Serge Vines MD  Interventional Pain Management  [facility]    Disclaimer:  This note was prepared using voice recognition system and is likely to have sound alike errors that may have been overlooked even after proof reading.  Please call me with any questions

## 2024-03-15 NOTE — TELEPHONE ENCOUNTER
Called patient to inform her that the referral has been faxed to [facility].  Patient verbalized understanding

## 2024-03-18 PROBLEM — F41.8 SITUATIONAL ANXIETY: Status: ACTIVE | Noted: 2024-03-18

## 2024-03-18 PROBLEM — F11.21 HISTORY OF NARCOTIC ADDICTION: Status: ACTIVE | Noted: 2024-03-18

## 2024-03-18 PROBLEM — L30.9 DERMATITIS: Status: ACTIVE | Noted: 2024-03-18

## 2024-04-02 ENCOUNTER — OFFICE VISIT (OUTPATIENT)
Dept: DERMATOLOGY | Facility: CLINIC | Age: 44
End: 2024-04-02
Payer: COMMERCIAL

## 2024-04-02 ENCOUNTER — LAB VISIT (OUTPATIENT)
Dept: LAB | Facility: HOSPITAL | Age: 44
End: 2024-04-02
Attending: DERMATOLOGY
Payer: COMMERCIAL

## 2024-04-02 DIAGNOSIS — M46.1 SACROILIITIS: ICD-10-CM

## 2024-04-02 DIAGNOSIS — L21.9 SEBORRHEIC DERMATITIS: ICD-10-CM

## 2024-04-02 DIAGNOSIS — T14.8XXA EXCORIATION: ICD-10-CM

## 2024-04-02 DIAGNOSIS — T14.8XXA EXCORIATION: Primary | ICD-10-CM

## 2024-04-02 DIAGNOSIS — B02.29 POST HERPETIC NEURALGIA: ICD-10-CM

## 2024-04-02 LAB — HIV 1+2 AB+HIV1 P24 AG SERPL QL IA: NORMAL

## 2024-04-02 PROCEDURE — 99999 PR PBB SHADOW E&M-EST. PATIENT-LVL III: CPT | Mod: PBBFAC,,, | Performed by: DERMATOLOGY

## 2024-04-02 PROCEDURE — 1159F MED LIST DOCD IN RCRD: CPT | Mod: CPTII,S$GLB,, | Performed by: DERMATOLOGY

## 2024-04-02 PROCEDURE — 99204 OFFICE O/P NEW MOD 45 MIN: CPT | Mod: S$GLB,,, | Performed by: DERMATOLOGY

## 2024-04-02 PROCEDURE — 36415 COLL VENOUS BLD VENIPUNCTURE: CPT | Performed by: DERMATOLOGY

## 2024-04-02 PROCEDURE — 87529 HSV DNA AMP PROBE: CPT | Mod: 59 | Performed by: DERMATOLOGY

## 2024-04-02 PROCEDURE — 87389 HIV-1 AG W/HIV-1&-2 AB AG IA: CPT | Performed by: DERMATOLOGY

## 2024-04-02 PROCEDURE — 86787 VARICELLA-ZOSTER ANTIBODY: CPT | Performed by: DERMATOLOGY

## 2024-04-02 PROCEDURE — 86696 HERPES SIMPLEX TYPE 2 TEST: CPT | Performed by: DERMATOLOGY

## 2024-04-02 PROCEDURE — 86787 VARICELLA-ZOSTER ANTIBODY: CPT | Mod: 91 | Performed by: DERMATOLOGY

## 2024-04-02 PROCEDURE — 1160F RVW MEDS BY RX/DR IN RCRD: CPT | Mod: CPTII,S$GLB,, | Performed by: DERMATOLOGY

## 2024-04-02 PROCEDURE — 87070 CULTURE OTHR SPECIMN AEROBIC: CPT | Performed by: DERMATOLOGY

## 2024-04-02 RX ORDER — TRIAMCINOLONE ACETONIDE 0.25 MG/G
CREAM TOPICAL 2 TIMES DAILY PRN
Qty: 80 G | Refills: 1 | Status: SHIPPED | OUTPATIENT
Start: 2024-04-02

## 2024-04-02 NOTE — PROGRESS NOTES
Subjective:      Patient ID:  Sylvia Thakur is a 43 y.o. female who presents for No chief complaint on file.    History of Present Illness: The patient presents with chief complaint of pain.  Location: scalp  Duration: since 2007  Signs/Symptoms: painful (shooting)    Prior treatments: gabapentin, elavil, zoloft, TAC cream    She is also concerned about current shingles of the left posterior thigh. She c/o monthly flares of the scalp related to menses.        Review of Systems   Constitutional:  Negative for fever and chills.   Gastrointestinal:  Negative for nausea and vomiting.   Skin:  Positive for activity-related sunscreen use. Negative for daily sunscreen use and recent sunburn.   Hematologic/Lymphatic: Does not bruise/bleed easily.       Objective:   Physical Exam   Constitutional: She appears well-developed and well-nourished. No distress.   Neurological: She is alert and oriented to person, place, and time. She is not disoriented.   Psychiatric: She has a normal mood and affect.   Skin:   Areas Examined (abnormalities noted in diagram):   Head / Face Inspection Performed  Neck Inspection Performed  RUE Inspected  LUE Inspection Performed  RLE Inspected  LLE Inspection Performed  Nails and Digits Inspection Performed                  Assessment / Plan:        Excoriation  Post herpetic neuralgia  -     Ambulatory referral/consult to Dermatology  -     HSV by Rapid PCR, Non-Blood [facility]; Skin; Future; Expected date: 04/02/2024  -     Aerobic culture  -     HERPES SIMPLEX 1&2 IGG; Future; Expected date: 04/02/2024  -     VARICELLA ZOSTER ANTIBODY, IGG; Future; Expected date: 04/02/2024  -     VARICELLA ZOSTER ANTIBODY, IGM; Future; Expected date: 04/02/2024  -     HIV 1/2 Ag/Ab (4th Gen); Future; Expected date: 04/02/2024  -     Ambulatory referral/consult to Pain Clinic  -     Ambulatory referral/consult to Neurology; Future; Expected date: 04/09/2024  -     recommend f/u with neurology for management of  neuropathic pain medications (gabapentin, elavil).  Will check above labs and bacterial culture/viral PCR today.  Will start topical pain anesthetic. No active/primary lesions of scalp for biopsy. The patient acknowledged understanding.     Seborrheic dermatitis  -     triamcinolone acetonide 0.025% (KENALOG) 0.025 % cream; Apply topically 2 (two) times daily as needed (for dryness on face). Mild steroid. Use sparingly for 1-2 weeks if needed then stop.  Dispense: 80 g; Refill: 1  -     mild of central face, discussed relationship to neurologic conditions and stress. The patient acknowledged understanding.              Follow up in about 3 months (around 7/2/2024).

## 2024-04-03 LAB
HSV1 IGG SERPL QL IA: NEGATIVE
HSV2 IGG SERPL QL IA: POSITIVE
VARICELLA INTERPRETATION: POSITIVE
VARICELLA ZOSTER IGG: 1742 AU/ML

## 2024-04-04 ENCOUNTER — PATIENT MESSAGE (OUTPATIENT)
Dept: DERMATOLOGY | Facility: CLINIC | Age: 44
End: 2024-04-04
Payer: COMMERCIAL

## 2024-04-04 LAB
BACTERIA SPEC AEROBE CULT: NORMAL
HSV1 DNA SPEC QL NAA+PROBE: NEGATIVE
HSV2 DNA SPEC QL NAA+PROBE: NEGATIVE
SPECIMEN SOURCE: NORMAL

## 2024-04-08 LAB — VZV IGM SER IA-ACNC: 0.54

## 2024-05-30 ENCOUNTER — OFFICE VISIT (OUTPATIENT)
Dept: RHEUMATOLOGY | Facility: CLINIC | Age: 44
End: 2024-05-30
Payer: COMMERCIAL

## 2024-05-30 VITALS
HEART RATE: 84 BPM | SYSTOLIC BLOOD PRESSURE: 108 MMHG | WEIGHT: 177 LBS | BODY MASS INDEX: 30.22 KG/M2 | HEIGHT: 64 IN | DIASTOLIC BLOOD PRESSURE: 76 MMHG

## 2024-05-30 DIAGNOSIS — T14.8XXA EXCORIATION: ICD-10-CM

## 2024-05-30 DIAGNOSIS — B02.29 POST HERPETIC NEURALGIA: ICD-10-CM

## 2024-05-30 DIAGNOSIS — B02.8 HERPES ZOSTER WITH COMPLICATION: Primary | ICD-10-CM

## 2024-05-30 PROCEDURE — 3078F DIAST BP <80 MM HG: CPT | Mod: CPTII,S$GLB,, | Performed by: STUDENT IN AN ORGANIZED HEALTH CARE EDUCATION/TRAINING PROGRAM

## 2024-05-30 PROCEDURE — 3044F HG A1C LEVEL LT 7.0%: CPT | Mod: CPTII,S$GLB,, | Performed by: STUDENT IN AN ORGANIZED HEALTH CARE EDUCATION/TRAINING PROGRAM

## 2024-05-30 PROCEDURE — 99999 PR PBB SHADOW E&M-EST. PATIENT-LVL V: CPT | Mod: PBBFAC,,, | Performed by: STUDENT IN AN ORGANIZED HEALTH CARE EDUCATION/TRAINING PROGRAM

## 2024-05-30 PROCEDURE — 1159F MED LIST DOCD IN RCRD: CPT | Mod: CPTII,S$GLB,, | Performed by: STUDENT IN AN ORGANIZED HEALTH CARE EDUCATION/TRAINING PROGRAM

## 2024-05-30 PROCEDURE — 99204 OFFICE O/P NEW MOD 45 MIN: CPT | Mod: S$GLB,,, | Performed by: STUDENT IN AN ORGANIZED HEALTH CARE EDUCATION/TRAINING PROGRAM

## 2024-05-30 PROCEDURE — 3074F SYST BP LT 130 MM HG: CPT | Mod: CPTII,S$GLB,, | Performed by: STUDENT IN AN ORGANIZED HEALTH CARE EDUCATION/TRAINING PROGRAM

## 2024-05-30 PROCEDURE — 3008F BODY MASS INDEX DOCD: CPT | Mod: CPTII,S$GLB,, | Performed by: STUDENT IN AN ORGANIZED HEALTH CARE EDUCATION/TRAINING PROGRAM

## 2024-05-30 RX ORDER — GABAPENTIN 300 MG/1
300 CAPSULE ORAL 3 TIMES DAILY
Qty: 90 CAPSULE | Refills: 11 | Status: SHIPPED | OUTPATIENT
Start: 2024-05-30

## 2024-05-30 RX ORDER — ERGOCALCIFEROL 1.25 MG/1
50000 CAPSULE ORAL
Qty: 12 CAPSULE | Refills: 0 | Status: SHIPPED | OUTPATIENT
Start: 2024-05-30 | End: 2024-08-28

## 2024-05-30 RX ORDER — SERDEXMETHYLPHENIDATE AND DEXMETHYLPHENIDATE 7.8; 39.2 MG/1; MG/1
1 CAPSULE ORAL DAILY
COMMUNITY

## 2024-05-30 NOTE — PROGRESS NOTES
"Subjective:      Patient ID: Sylvia Thakur is a 44 y.o. female.    Chief Complaint: nerve pain    HPI:   Patient presents for evaluation of nerve pain. She was meant to see Neurology but somehow got scheduled with me today instead. She has been having lesions on the scalp, arms and legs. Happens on both right and left side of the body. Associated with severe skin pain to the point where she's in tears. Looks like purulent and excoriated lesion on the right side of scalp today but also having severe pain of the skin on the left side of scalp. She thinks this might be shingles based on internet searches. She saw Dermatology who called it post-herpetic neuralgia. She has positive HSV2 IgG and positive VZV IgG with negative VZV IgM. She has been taking gabapentin and amitriptyline irregularly, only when she gets pain.     Denies joint swelling, significant stiffness, oral ulcers, patchy alopecia, sicca symptoms, cardiopulmonary symptoms, eye inflammation, IBD, fevers, weight loss, Raynaud's. Rheumatology review of systems is otherwise negative.      Objective:   /76   Pulse 84   Ht 5' 4" (1.626 m)   Wt 80.3 kg (177 lb 0.5 oz)   LMP 05/09/2024 (Approximate)   BMI 30.39 kg/m²   Physical Exam  Constitutional:       General: She is not in acute distress.     Appearance: Normal appearance.   HENT:      Head: Normocephalic and atraumatic.      Comments: Purulent and excoriated lesion on right scalp     Mouth/Throat:      Mouth: Mucous membranes are moist.      Pharynx: Oropharynx is clear.   Cardiovascular:      Rate and Rhythm: Normal rate and regular rhythm.   Pulmonary:      Effort: Pulmonary effort is normal.      Breath sounds: Normal breath sounds.   Abdominal:      Palpations: Abdomen is soft.      Tenderness: There is no abdominal tenderness.   Musculoskeletal:         General: No swelling or tenderness.      Cervical back: Normal range of motion. No tenderness.   Skin:     General: Skin is warm and dry. "   Neurological:      Mental Status: She is alert and oriented to person, place, and time. Mental status is at baseline.           Assessment:     1. Herpes zoster with complication    2. Post herpetic neuralgia    3. Excoriation          Plan:     Problem List Items Addressed This Visit          Unprioritized    Post herpetic neuralgia    Relevant Orders    Ambulatory referral/consult to Neurology    Ambulatory referral/consult to Infectious Disease     Other Visit Diagnoses       Herpes zoster with complication    -  Primary    Relevant Orders    Ambulatory referral/consult to Neurology    Ambulatory referral/consult to Infectious Disease    Excoriation        Relevant Orders    Ambulatory referral/consult to Neurology    Ambulatory referral/consult to Infectious Disease            Patient presents for evaluation of nerve pain. She was meant to see Neurology but somehow got scheduled with me today instead. I decided to see her because she is in a lot of pain and I worry she won't get a Neurology appointment soon enough. Lesions on scalp, arms, legs. They appear on both right and left side of her body. Might be HSV. Associated with severe skin pain.     Also has vitamin D deficiency. Not on supplements.          Plan:  Take vitamin D 50,000 units once a week for 3 months.  Get an over-the-counter supplement of vitamin D 1,000 units daily.    Take gabapentin 300 mg three times daily. Take as prescribed, not as needed.    Referral to Neurology for help with the nerve pain.    Referral to Infectious Disease to help with the diagnosis of whether or not this is HSV or VZV or something else, and treatment.

## 2024-05-30 NOTE — PATIENT INSTRUCTIONS
Take vitamin D 50,000 units once a week for 3 months.  Get an over-the-counter supplement of vitamin D 1,000 units daily.    Take gabapentin 300 mg three times daily.    Referral to Neurology for help with the nerve pain.    Referral to Infectious Disease to help with the diagnosis of whether or not this is HSV or VZV or something else, and treatment.

## 2024-06-03 ENCOUNTER — TELEPHONE (OUTPATIENT)
Dept: INFECTIOUS DISEASES | Facility: CLINIC | Age: 44
End: 2024-06-03
Payer: COMMERCIAL

## 2024-06-03 NOTE — TELEPHONE ENCOUNTER
----- Message from Shilpa Balderas sent at 6/3/2024  2:28 PM CDT -----  Type: Appointment Request     Name of Caller: JOLEEN BOYCE [[id number]]    When is the first available appointment? Do not have access    Reason for Visit:  B02.29 (ICD-10-CM) - Post herpetic neuralgia  T14.8XXA (ICD-10-CM) - Excoriation  B02.8 (ICD-10-CM) - Herpes zoster with complication       Best Call Back Number: [phone]    Additional Information:

## 2024-06-03 NOTE — TELEPHONE ENCOUNTER
Attempted to rtn pt call for scheduling. Pt did not answer.LVM to rtc to clinic. Pt scheduled for first available appt with Dr. Cool

## 2024-06-14 ENCOUNTER — TELEPHONE (OUTPATIENT)
Dept: INFECTIOUS DISEASES | Facility: CLINIC | Age: 44
End: 2024-06-14
Payer: COMMERCIAL

## 2024-06-14 NOTE — TELEPHONE ENCOUNTER
2nd attempt: Attempted to schedule pt from referral placed to ID. Pt did not answer. LVM to rtc to clinic.

## 2024-07-06 NOTE — PROGRESS NOTES
"Subjective:      Patient ID: Sylvia Thakur is a 44 y.o. female.    Chief Complaint:  " PHN ".     HPI 44 Years old AA Female with PMHx of PHN / LAUREN / ADHD and others Medical issues   came for the evaluation and recommendation of " PHN ".      Started: 2012.   Describes: shooting.   Timing: intermittent.   Frequency: daily.   Pain:  3 to 6/ 10.   Location: " all over my head ".   Family: none.   Medications: Elavil.   Worsen: none.   Alleviated: none.   Associated symptoms:   none.   Triggers: none.   Prodrome symptoms: none.          Referral by PCP.        PHN: diagnosed around 2012.        Elavil - since 2012 - pain back around 2020 / progressing.     Review of Systems  Review of Systems   All other systems reviewed and are negative.    Objective:     Neurologic Exam     Mental Status   Oriented to person, place, and time.   Registration: recalls 3 of 3 objects. Recall at 5 minutes: recalls 3 of 3 objects. Follows 3 step commands.   Attention: normal. Concentration: normal.   Speech: speech is normal   Level of consciousness: alert  Knowledge: good. Able to perform simple calculations.   Able to name object. Able to read. Able to repeat. Able to write. Normal comprehension.     Cranial Nerves   Cranial nerves II through XII intact.     CN III, IV, VI   Pupils are equal, round, and reactive to light.    Motor Exam   Muscle bulk: normal  Overall muscle tone: normal    Strength   Strength 5/5 throughout.   Right neck flexion: 5/5  Left neck flexion: 5/5  Right neck extension: 5/5  Left neck extension: 5/5  Right deltoid: 5/5  Left deltoid: 5/5  Right biceps: 5/5  Left biceps: 5/5  Right triceps: 5/5  Left triceps: 5/5  Right wrist flexion: 5/5  Left wrist flexion: 5/5  Right wrist extension: 5/5  Left wrist extension: 5/5  Right interossei: 5/5  Left interossei: 5/5  Right abdominals: 5/5  Left abdominals: 5/5  Right iliopsoas: 5/5  Left iliopsoas: 5/5  Right quadriceps: 5/5  Left quadriceps: 5/5  Right hamstring: " 5/5  Left hamstrin/5  Right glutei: 5/5  Left glutei: 5/5  Right anterior tibial: 5/5  Left anterior tibial: 5/5  Right posterior tibial: 5/5  Left posterior tibial: 5/5  Right peroneal: 5/5  Left peroneal: 5/5  Right gastroc: 5/5  Left gastroc: 5/5    Sensory Exam   Light touch normal.   Vibration normal.   Proprioception normal.   Pinprick normal.   Graphesthesia: normal  Stereognosis: normal    Gait, Coordination, and Reflexes     Gait  Gait: normal    Coordination   Romberg: negative  Finger to nose coordination: normal  Heel to shin coordination: normal  Tandem walking coordination: normal    Tremor   Resting tremor: absent  Intention tremor: absent  Action tremor: absent    Reflexes   Right brachioradialis: 2+  Left brachioradialis: 2+  Right biceps: 2+  Left biceps: 2+  Right triceps: 2+  Left triceps: 2+  Right patellar: 2+  Left patellar: 2+  Right achilles: 2+  Left achilles: 2+  Right : 2+  Left : 2+  Right plantar: normal  Left plantar: normal  Right Jones: absent  Left Jones: absent  Right ankle clonus: absent  Left ankle clonus: absent  Right pendular knee jerk: absent  Left pendular knee jerk: absent      Physical Exam  Constitutional:       Appearance: Normal appearance. She is normal weight.   HENT:      Head: Normocephalic and atraumatic.      Right Ear: Tympanic membrane normal.      Left Ear: Tympanic membrane normal.      Nose: Nose normal.      Mouth/Throat:      Mouth: Mucous membranes are moist.      Pharynx: Oropharynx is clear.   Eyes:      Extraocular Movements: Extraocular movements intact.      Conjunctiva/sclera: Conjunctivae normal.      Pupils: Pupils are equal, round, and reactive to light.   Cardiovascular:      Rate and Rhythm: Normal rate and regular rhythm.      Pulses: Normal pulses.      Heart sounds: Normal heart sounds.   Pulmonary:      Effort: Pulmonary effort is normal.      Breath sounds: Normal breath sounds.   Abdominal:      General: Abdomen is flat.  Bowel sounds are normal.      Palpations: Abdomen is soft.   Genitourinary:     Comments: Deferred.   Musculoskeletal:         General: Normal range of motion.      Cervical back: Normal range of motion and neck supple.   Skin:     General: Skin is warm and dry.   Neurological:      Mental Status: She is alert and oriented to person, place, and time. Mental status is at baseline.      Cranial Nerves: Cranial nerves 2-12 are intact.      Motor: Motor strength is normal.     Coordination: Finger-Nose-Finger Test, Heel to Shin Test and Romberg Test normal.      Gait: Gait is intact. Tandem walk normal.      Deep Tendon Reflexes:      Reflex Scores:       Tricep reflexes are 2+ on the right side and 2+ on the left side.       Bicep reflexes are 2+ on the right side and 2+ on the left side.       Brachioradialis reflexes are 2+ on the right side and 2+ on the left side.       Patellar reflexes are 2+ on the right side and 2+ on the left side.       Achilles reflexes are 2+ on the right side and 2+ on the left side.  Psychiatric:         Mood and Affect: Mood normal.         Speech: Speech normal.         Behavior: Behavior normal.         Thought Content: Thought content normal.         Judgment: Judgment normal.          Assessment:   44 Years old AA Female with PMHX as above came of the evaluation of PHN.   - PHN.   Plan:   Patient Neurological Assessment is non focal.     Continue Elavil 100 mg.     Add  mg x day.     Labs: CBC / CMP / Hep C - B / HIV / Vit B 12 / TSH / Free T 3 - 4 / Lipids panel / RPR - 2024 - non significant abnormalities.     Please do not hesitate to contact me with any updates, questions or concerns.    Ervin June MD.  General Neurologist.

## 2024-07-09 ENCOUNTER — TELEPHONE (OUTPATIENT)
Dept: NEUROLOGY | Facility: CLINIC | Age: 44
End: 2024-07-09
Payer: COMMERCIAL

## 2024-07-10 ENCOUNTER — OFFICE VISIT (OUTPATIENT)
Dept: NEUROLOGY | Facility: CLINIC | Age: 44
End: 2024-07-10
Payer: COMMERCIAL

## 2024-07-10 ENCOUNTER — TELEPHONE (OUTPATIENT)
Dept: NEUROLOGY | Facility: CLINIC | Age: 44
End: 2024-07-10
Payer: COMMERCIAL

## 2024-07-10 VITALS
DIASTOLIC BLOOD PRESSURE: 81 MMHG | WEIGHT: 179.69 LBS | SYSTOLIC BLOOD PRESSURE: 144 MMHG | OXYGEN SATURATION: 99 % | HEART RATE: 90 BPM | RESPIRATION RATE: 16 BRPM | HEIGHT: 64 IN | BODY MASS INDEX: 30.68 KG/M2

## 2024-07-10 DIAGNOSIS — B02.29 POST HERPETIC NEURALGIA: ICD-10-CM

## 2024-07-10 PROCEDURE — 3008F BODY MASS INDEX DOCD: CPT | Mod: CPTII,S$GLB,, | Performed by: PSYCHIATRY & NEUROLOGY

## 2024-07-10 PROCEDURE — 99204 OFFICE O/P NEW MOD 45 MIN: CPT | Mod: S$GLB,,, | Performed by: PSYCHIATRY & NEUROLOGY

## 2024-07-10 PROCEDURE — 99999 PR PBB SHADOW E&M-EST. PATIENT-LVL IV: CPT | Mod: PBBFAC,,, | Performed by: PSYCHIATRY & NEUROLOGY

## 2024-07-10 PROCEDURE — 1159F MED LIST DOCD IN RCRD: CPT | Mod: CPTII,S$GLB,, | Performed by: PSYCHIATRY & NEUROLOGY

## 2024-07-10 PROCEDURE — 3079F DIAST BP 80-89 MM HG: CPT | Mod: CPTII,S$GLB,, | Performed by: PSYCHIATRY & NEUROLOGY

## 2024-07-10 PROCEDURE — 3044F HG A1C LEVEL LT 7.0%: CPT | Mod: CPTII,S$GLB,, | Performed by: PSYCHIATRY & NEUROLOGY

## 2024-07-10 PROCEDURE — 3077F SYST BP >= 140 MM HG: CPT | Mod: CPTII,S$GLB,, | Performed by: PSYCHIATRY & NEUROLOGY

## 2024-07-10 PROCEDURE — 1160F RVW MEDS BY RX/DR IN RCRD: CPT | Mod: CPTII,S$GLB,, | Performed by: PSYCHIATRY & NEUROLOGY

## 2024-07-10 RX ORDER — CARBAMAZEPINE 100 MG/1
100 TABLET, EXTENDED RELEASE ORAL DAILY
Qty: 30 TABLET | Refills: 3 | Status: SHIPPED | OUTPATIENT
Start: 2024-07-10 | End: 2025-07-10

## 2024-07-10 NOTE — TELEPHONE ENCOUNTER
----- Message from Marielle Vizcarra sent at 7/10/2024  2:29 PM CDT -----  Contact: self  pt states she went to wrong location but is on her way shoulld arrive 2:38 please call back at [phone] Thx CJ

## 2024-08-20 ENCOUNTER — TELEPHONE (OUTPATIENT)
Dept: NEUROLOGY | Facility: CLINIC | Age: 44
End: 2024-08-20

## 2024-08-20 NOTE — TELEPHONE ENCOUNTER
Called pt to confirm appt. Pt answered but there was no response. I hung up and called again with no answer.

## 2024-08-22 RX ORDER — ERGOCALCIFEROL 1.25 MG/1
50000 CAPSULE ORAL
Qty: 12 CAPSULE | Refills: 0 | Status: SHIPPED | OUTPATIENT
Start: 2024-08-22

## 2024-09-27 DIAGNOSIS — L21.9 SEBORRHEIC DERMATITIS: ICD-10-CM

## 2024-09-30 RX ORDER — TRIAMCINOLONE ACETONIDE 0.25 MG/G
CREAM TOPICAL 2 TIMES DAILY PRN
Qty: 80 G | Refills: 1 | Status: SHIPPED | OUTPATIENT
Start: 2024-09-30

## 2025-01-22 DIAGNOSIS — L21.9 SEBORRHEIC DERMATITIS: ICD-10-CM

## 2025-01-27 RX ORDER — TRIAMCINOLONE ACETONIDE 0.25 MG/G
CREAM TOPICAL 2 TIMES DAILY PRN
Qty: 80 G | Refills: 1 | Status: SHIPPED | OUTPATIENT
Start: 2025-01-27

## 2025-01-27 RX ORDER — GABAPENTIN 300 MG/1
300 CAPSULE ORAL 3 TIMES DAILY
Qty: 90 CAPSULE | Refills: 11 | Status: SHIPPED | OUTPATIENT
Start: 2025-01-27

## 2025-03-13 ENCOUNTER — PATIENT MESSAGE (OUTPATIENT)
Dept: RHEUMATOLOGY | Facility: CLINIC | Age: 45
End: 2025-03-13

## 2025-06-03 RX ORDER — GABAPENTIN 300 MG/1
300 CAPSULE ORAL 3 TIMES DAILY
Qty: 90 CAPSULE | Refills: 0 | Status: SHIPPED | OUTPATIENT
Start: 2025-06-03